# Patient Record
Sex: MALE | Race: AMERICAN INDIAN OR ALASKA NATIVE
[De-identification: names, ages, dates, MRNs, and addresses within clinical notes are randomized per-mention and may not be internally consistent; named-entity substitution may affect disease eponyms.]

---

## 2022-09-03 ENCOUNTER — HOSPITAL ENCOUNTER (EMERGENCY)
Dept: HOSPITAL 5 - ED | Age: 37
LOS: 1 days | Discharge: HOME | End: 2022-09-04
Payer: SELF-PAY

## 2022-09-03 DIAGNOSIS — Z20.822: ICD-10-CM

## 2022-09-03 DIAGNOSIS — B34.9: Primary | ICD-10-CM

## 2022-09-03 DIAGNOSIS — R50.9: ICD-10-CM

## 2022-09-03 DIAGNOSIS — Z79.899: ICD-10-CM

## 2022-09-03 DIAGNOSIS — J00: ICD-10-CM

## 2022-09-03 DIAGNOSIS — R05.9: ICD-10-CM

## 2022-09-03 PROCEDURE — 87430 STREP A AG IA: CPT

## 2022-09-03 PROCEDURE — 87116 MYCOBACTERIA CULTURE: CPT

## 2022-09-03 PROCEDURE — 99283 EMERGENCY DEPT VISIT LOW MDM: CPT

## 2022-09-03 NOTE — EMERGENCY DEPARTMENT REPORT
ED General Adult HPI





- General


Chief complaint: Fever


Stated complaint: COLD/FEVER


Time Seen by Provider: 09/03/22 23:46


Source: patient


Mode of arrival: Ambulatory


Limitations: No Limitations





- History of Present Illness


Initial comments: 





Patient is a 37-year-old male who presents with "cold and fever symptoms for the

last 2 weeks.  He states his main symptom is throat pain and hoarseness.  No 

dysphagia but he has had a dyne aphasia.  He is also had some runny nose and 

cough last fever was 3 days ago.  He has had some headache and myalgias.  Anorex

ia as well as ageusia.  He is not tested for COVID during this 2-week..  He is 

not vaccinated.  Denies any health problems but his blood pressure is elevated 

in triage.





- Related Data


                                    Allergies











Allergy/AdvReac Type Severity Reaction Status Date / Time


 


No Known Allergies Allergy   Unverified 09/04/22 00:05














ED Review of Systems


ROS: 


Stated complaint: COLD/FEVER


Other details as noted in HPI








ED Past Medical Hx





- Past Medical History


Previous Medical History?: No





- Surgical History


Past Surgical History?: No





- Social History


Smoking Status: Never Smoker





ED Physical Exam





- General


Limitations: No Limitations





ED Course


                                   Vital Signs











  09/03/22 09/04/22





  19:54 00:37


 


Temperature 98.3 F 


 


Pulse Rate 95 H 65


 


Respiratory 18 12





Rate  


 


Blood Pressure 168/103 


 


Blood Pressure  129/95





[Left]  


 


O2 Sat by Pulse 99 98





Oximetry  








Repeat blood pressure 129/95





- Reevaluation(s)


Reevaluation #1: 





09/04/22 01:16


Strep screen negative





ED Medical Decision Making





- Medical Decision Making





Strep screen negative.  Will provide instructions on supportive care for likely 

viral syndrome.  Likely COVID.





- Differential Diagnosis


Strep pharyngitis.  Viral syndrome.  COVID.  Influenza.


Critical care attestation.: 


If time is entered above; I have spent that time in minutes in the direct care 

of this critically ill patient, excluding procedure time.








ED Disposition


Clinical Impression: 


 Viral syndrome, Suspected COVID-19 virus infection





Disposition: 01 HOME / SELF CARE / HOMELESS


Is pt being admited?: No


Condition: Stable


Instructions:  COVID-19 Frequently Asked Questions, Viral Respiratory Infection,

 Easy-To-Read, COVID-19: How to Protect Yourself and Others - CDC, Prevent the 

Spread of COVID-19 if You Are Sick - [facility]


Additional Instructions: 


Guaifenesin 1200 mg twice daily extended release.  Tylenol or Motrin as needed 

for fever pain.  Warm compresses to the sinuses, warm salt water gargles, nasal 

saline.  Return to ER if increasing shortness of breath, unable to keep down 

food or drink or take care of yourself.  Quarantine until all symptoms improving

 and fever free for 24 hours without Tylenol or Motrin.


Referrals: 


FLORECITA URBINA MD [Primary Care Provider] - 3-5 Days


Forms:  Work/School Release Form(ED)


Time of Disposition: 01:21

## 2022-09-04 VITALS — DIASTOLIC BLOOD PRESSURE: 95 MMHG | SYSTOLIC BLOOD PRESSURE: 129 MMHG
